# Patient Record
Sex: FEMALE | Race: WHITE | NOT HISPANIC OR LATINO | URBAN - METROPOLITAN AREA
[De-identification: names, ages, dates, MRNs, and addresses within clinical notes are randomized per-mention and may not be internally consistent; named-entity substitution may affect disease eponyms.]

---

## 2020-07-29 ENCOUNTER — OFFICE VISIT (OUTPATIENT)
Dept: URBAN - METROPOLITAN AREA CLINIC 94 | Facility: CLINIC | Age: 25
End: 2020-07-29

## 2020-07-29 ENCOUNTER — WEB ENCOUNTER (OUTPATIENT)
Dept: URBAN - METROPOLITAN AREA CLINIC 94 | Facility: CLINIC | Age: 25
End: 2020-07-29

## 2020-07-29 DIAGNOSIS — R16.0 HEPATOMEGALY: ICD-10-CM

## 2020-07-29 DIAGNOSIS — R10.11 RUQ ABDOMINAL PAIN: ICD-10-CM

## 2020-07-29 PROCEDURE — 80074 ACUTE HEPATITIS PANEL: CPT | Performed by: INTERNAL MEDICINE

## 2020-07-29 PROCEDURE — G8427 DOCREV CUR MEDS BY ELIG CLIN: HCPCS | Performed by: INTERNAL MEDICINE

## 2020-07-29 PROCEDURE — 1036F TOBACCO NON-USER: CPT | Performed by: INTERNAL MEDICINE

## 2020-07-29 PROCEDURE — G9716 BMI DOC ONL FUP NOT CMPLTD: HCPCS | Performed by: INTERNAL MEDICINE

## 2020-07-29 PROCEDURE — 82104 ALPHA-1-ANTITRYPSIN PHENO: CPT | Performed by: INTERNAL MEDICINE

## 2020-07-29 PROCEDURE — 99203 OFFICE O/P NEW LOW 30 MIN: CPT | Performed by: INTERNAL MEDICINE

## 2020-07-29 RX ORDER — FAMOTIDINE 20 MG/1
1 TABLET AT BEDTIME AS NEEDED TABLET, FILM COATED ORAL ONCE A DAY
Status: DISCONTINUED | COMMUNITY

## 2020-07-29 RX ORDER — VITAMIN A 2400 MCG
1 TABLET CAPSULE ORAL ONCE A DAY
Status: DISCONTINUED | COMMUNITY

## 2020-07-29 RX ORDER — ESCITALOPRAM OXALATE 10 MG/1
1 TABLET TABLET, FILM COATED ORAL ONCE A DAY
Status: ACTIVE | COMMUNITY

## 2020-07-29 NOTE — HPI-TODAY'S VISIT:
Abdominal pain after eating. RUQ. Cramp. Occ bad. No rad NV, frequent.  Anorexia. Lost 30 lb in 5 mo. Exac-eat. Allev-?  27 week pregnant. EDC 10/31. No prior GI issues. Sono-enlarged liver. Odalis VO.  LTs this month-NL

## 2020-08-04 LAB
ACTIN (SMOOTH MUSCLE) ANTIBODY: 7
ALBUMIN: 3.4
ALKALINE PHOSPHATASE: 79
ALPHA-1-ANTITRYPSIN, SERUM: 202
ALT (SGPT): 4
ANTI-CENTROMERE B ANTIBODIES: <0.2
ANTI-DNA (DS) AB QN: <1
ANTI-JO-1: <0.2
ANTICHROMATIN ANTIBODIES: <0.2
ANTINUCLEAR ANTIBODIES, IFA: POSITIVE
ANTISCLERODERMA-70 ANTIBODIES: <0.2
AST (SGOT): 10
BILIRUBIN, DIRECT: 0.06
BILIRUBIN, TOTAL: <0.2
CENTRIOLE PATTERN: (no result)
CENTROMERE PATTERN: (no result)
CERULOPLASMIN: 61.4
FERRITIN, SERUM: 10
HBSAG SCREEN: NEGATIVE
HEP A AB, IGM: NEGATIVE
HEP B CORE AB, IGM: NEGATIVE
HEP C VIRUS AB: <0.1
HOMOGENEOUS PATTERN: (no result)
IRON BIND.CAP.(TIBC): 458
IRON SATURATION: 8
IRON: 37
Lab: (no result)
MIDBODY PATTERN: (no result)
MITOCHONDRIAL (M2) ANTIBODY: <20
NUCLEAR DOT PATTERN: (no result)
NUCLEAR MEMBRANE PATTERN: (no result)
NUCLEOLAR PATTERN: (no result)
PCNA PATTERN: (no result)
PHENOTYPE (PI): (no result)
PROTEIN, TOTAL: 6.6
RNP ANTIBODIES: <0.2
SJOGREN'S ANTI-SS-A: >8
SJOGREN'S ANTI-SS-B: 6.7
SMITH ANTIBODIES: <0.2
SPECKLED PATTERN: (no result)
SPINDLE APPARATUS PATTERN: (no result)
UIBC: 421

## 2020-08-11 ENCOUNTER — OFFICE VISIT (OUTPATIENT)
Dept: URBAN - METROPOLITAN AREA CLINIC 94 | Facility: CLINIC | Age: 25
End: 2020-08-11

## 2020-08-11 ENCOUNTER — CLAIMS CREATED FROM THE CLAIM WINDOW (OUTPATIENT)
Dept: URBAN - METROPOLITAN AREA CLINIC 94 | Facility: CLINIC | Age: 25
End: 2020-08-11

## 2020-08-11 DIAGNOSIS — R10.11 RUQ PAIN: ICD-10-CM

## 2020-08-11 DIAGNOSIS — R16.0 HEPATOMEGALY: ICD-10-CM

## 2020-08-11 DIAGNOSIS — R11.2 NAUSEA & VOMITING: ICD-10-CM

## 2020-08-11 DIAGNOSIS — R76.8 POSITIVE ANA (ANTINUCLEAR ANTIBODY): ICD-10-CM

## 2020-08-11 DIAGNOSIS — M35.00 SJOGREN'S SYNDROME: ICD-10-CM

## 2020-08-11 PROCEDURE — 1036F TOBACCO NON-USER: CPT | Performed by: INTERNAL MEDICINE

## 2020-08-11 PROCEDURE — G8417 CALC BMI ABV UP PARAM F/U: HCPCS | Performed by: INTERNAL MEDICINE

## 2020-08-11 PROCEDURE — 99213 OFFICE O/P EST LOW 20 MIN: CPT | Performed by: INTERNAL MEDICINE

## 2020-08-11 PROCEDURE — G8427 DOCREV CUR MEDS BY ELIG CLIN: HCPCS | Performed by: INTERNAL MEDICINE

## 2020-08-11 RX ORDER — ESCITALOPRAM OXALATE 10 MG/1
1 TABLET TABLET, FILM COATED ORAL ONCE A DAY
Status: ACTIVE | COMMUNITY

## 2020-08-11 NOTE — HPI-TODAY'S VISIT:
Abdominal pain after eating. RUQ. Cramp. Occ bad. No rad NV, frequent.  Anorexia. Lost 30 lb in 5 mo. Exac-eat. Allev-?  (8/11-States has dry mouth and dry eyes.  27 week pregnant. EDC 10/31. No prior GI issues. Sono-enlarged liver. Owise NL.  LTs this month-NL Repeat LTs-NL.  Ferritin 10. Iron sat 8%. ERNST + 1:640. Sjogrens subtype + AMA neg. ASMA neg. Alpha 1-202. Cerulo-61 HCV neg. HBSAg + core neg.

## 2020-08-11 NOTE — PHYSICAL EXAM GASTROINTESTINAL
Abdomen , soft, gravid uterus. Liver and Spleen , no hepatomegaly present , no hepatosplenomegaly , liver nontender , spleen not palpable

## 2020-08-12 ENCOUNTER — DASHBOARD ENCOUNTERS (OUTPATIENT)
Age: 25
End: 2020-08-12

## 2020-08-12 ENCOUNTER — OFFICE VISIT (OUTPATIENT)
Dept: URBAN - METROPOLITAN AREA CLINIC 70 | Facility: CLINIC | Age: 25
End: 2020-08-12

## 2020-08-12 LAB
ANTI-CENTROMERE B ANTIBODIES: <0.2
ANTI-DNA (DS) AB QN: <1
ANTI-JO-1: <0.2
ANTICHROMATIN ANTIBODIES: <0.2
ANTISCLERODERMA-70 ANTIBODIES: <0.2
C-REACTIVE PROTEIN, QUANT: 10
Lab: (no result)
RNP ANTIBODIES: <0.2
SEDIMENTATION RATE-WESTERGREN: 59
SJOGREN'S ANTI-SS-A: >8
SJOGREN'S ANTI-SS-B: 5.8
SMITH ANTIBODIES: <0.2

## 2020-08-12 RX ORDER — ESCITALOPRAM OXALATE 10 MG/1
1 TABLET TABLET, FILM COATED ORAL ONCE A DAY
Status: ACTIVE | COMMUNITY

## 2020-09-14 ENCOUNTER — OFFICE VISIT (OUTPATIENT)
Dept: URBAN - METROPOLITAN AREA CLINIC 94 | Facility: CLINIC | Age: 25
End: 2020-09-14